# Patient Record
(demographics unavailable — no encounter records)

---

## 2024-10-11 NOTE — ASSESSMENT
[FreeTextEntry1] : Rash, DDX bullous pemphigoid vs. other - new dx of uncertain prognosis - Biopsy x2 as below to confirm diagnosis - Continue clobetasol cream 0.05% BID as needed for rash for 2 weeks on, 1 week off cycles. AVOID on face/groin. Strong topical steroids should generally not be used on the face, in armpits, or in other skin folds, unless specifically directed otherwise. Overuse of steroids can lead to thinning of the skin, appearance of red blood vessels, or discoloration of the skin.  - Continue doxycycline 100mg BID - Check labs: CBC, CMP, quant gold, HIV, hepatitis, /230  Neoplasm of uncertain behavior x2, L forearm - H&E, DIF - Rule out bullous pemphigoid  PROCEDURE:  Punch Biopsy METHOD:  Punch DESCRIPTION OF PROCEDURE:  After informed consent was obtained, including a discussion of the risk for bleeding, infection, incomplete removal, scarring, and recurrence/persistence, the lesional area was prepped with hibiclens prior to infiltration with 1% lidocaine with epinephrine, 1:100,000 x 3 ml.  After waiting several minutes for epinephrine effect and that adequate anesthesia was achieved, the lesion was biopsied with a 4 mm punch.  Hemostasis was obtained.  There were no complications.  The biopsy was placed in formalin and sent for routine histopathological evaluation. 4-0 prolene sutures were used. Sutures will be removed in 14 days. The wound was then dressed with sterile petrolatum and a sterile dressing.  Wound care instructions were provided in verbal and written form including a 24-hour contact number in case of emergency. The patient will be notified by one of the clinical staff (upon return to clinic or by phone) regarding the biopsy results and the need for further treatment.      RTC 2 weeks suture removal

## 2024-10-11 NOTE — HISTORY OF PRESENT ILLNESS
[FreeTextEntry1] : rash [de-identified] : Ms. CASSIE MARTINO is a 85 year old F with DM, HTN, thyroid disease here for evaluation of below   #Blisters on hands and arms, xdays. Very itchy Was in [facility] last week for hyperglycemia; rash noted during hospital visit.  Started on clobetasol cream and PO doxycycline     Personal hx of skin cancer: no FHx of skin cancer: no Social Hx: here with Billy Jeffries. Imer (son) is HCP. Has 2 other sons. Born and raised in [place]; parents did business in [place] (dad [place]/mom [place])

## 2024-10-11 NOTE — PHYSICAL EXAM
[Alert] : alert [Oriented x 3] : ~L oriented x 3 [Well Nourished] : well nourished [Declined] : declined [FreeTextEntry3] : Focused exam only (see below) per patient request:  edematous pink annular non-scaly plaques on BUE; denuded blisters on hands/forearms

## 2024-10-24 NOTE — PHYSICAL EXAM
[Alert] : alert [Oriented x 3] : ~L oriented x 3 [Well Nourished] : well nourished [Declined] : declined [FreeTextEntry3] : Focused exam only (see below) per patient request:  pink patches with resolving hemorrhagic crusting from prior blisters on BUE, hands, distal feet eczematous scaling on hands and forearms

## 2024-10-24 NOTE — HISTORY OF PRESENT ILLNESS
[FreeTextEntry1] : rash [de-identified] : Ms. CASSIE MARTINO is a 85 year old F with DM, HTN, thyroid disease here for evaluation of below   #Blisters on hands and arms, xdays. Very itchy Was in [facility] last week for hyperglycemia; rash noted during hospital visit.  Started on clobetasol cream and PO doxycycline  - 10/24/24: Biopsied 10/11/24 on L forearm (H&E, DIF) most likely c/w bullous pemphigoid. /230 antibodies elevated >200.  Nursing home notes blisters on feet    Personal hx of skin cancer: no FHx of skin cancer: no Social Hx: here with Billy Zamarripa. Imer (son) is HCP. Has 2 other sons. Born and raised in [place]; parents did business in [place] (dad [place]/mom [place])

## 2024-10-24 NOTE — ASSESSMENT
[FreeTextEntry1] : #Eczema, hands - chronic; flaring #Bullous pemphigoid, hands & feet - new dx of uncertain prognosis, blisters from LV improved s/p doxycycline +/230 antibodies  - Diagnosis, chronic nature, disease course, treatment options and goals of therapy discussed - Continue clobetasol cream 0.05% BID as needed for rash for 2 weeks on, 1 week off cycles. AVOID on face/groin. Strong topical steroids should generally not be used on the face, in armpits, or in other skin folds, unless specifically directed otherwise. Overuse of steroids can lead to thinning of the skin, appearance of red blood vessels, or discoloration of the skin.  - START mupirocin ointment BID to crusted areas until healed - Continue doxycycline 100mg BID  - Sutures removed from L forearm; pt tolerated well - Will get PA for Dupixent injections if pt continues to flare despite above  RTC 6-8 weeks